# Patient Record
Sex: FEMALE | URBAN - METROPOLITAN AREA
[De-identification: names, ages, dates, MRNs, and addresses within clinical notes are randomized per-mention and may not be internally consistent; named-entity substitution may affect disease eponyms.]

---

## 2018-10-19 ENCOUNTER — APPOINTMENT (RX ONLY)
Dept: URBAN - METROPOLITAN AREA CLINIC 26 | Facility: CLINIC | Age: 48
Setting detail: DERMATOLOGY
End: 2018-10-19

## 2018-10-19 DIAGNOSIS — D18.0 HEMANGIOMA: ICD-10-CM

## 2018-10-19 PROBLEM — D18.01 HEMANGIOMA OF SKIN AND SUBCUTANEOUS TISSUE: Status: ACTIVE | Noted: 2018-10-19

## 2018-10-19 PROBLEM — L20.84 INTRINSIC (ALLERGIC) ECZEMA: Status: ACTIVE | Noted: 2018-10-19

## 2018-10-19 PROCEDURE — ? BENIGN DESTRUCTION COSMETIC MULTI

## 2018-10-19 ASSESSMENT — LOCATION SIMPLE DESCRIPTION DERM
LOCATION SIMPLE: RIGHT UPPER ARM
LOCATION SIMPLE: LEFT UPPER ARM
LOCATION SIMPLE: ABDOMEN
LOCATION SIMPLE: LEFT LOWER BACK
LOCATION SIMPLE: CHEST

## 2018-10-19 ASSESSMENT — LOCATION DETAILED DESCRIPTION DERM
LOCATION DETAILED: RIGHT MEDIAL SUPERIOR CHEST
LOCATION DETAILED: LEFT SUPERIOR LATERAL MIDBACK
LOCATION DETAILED: RIGHT ANTERIOR PROXIMAL UPPER ARM
LOCATION DETAILED: LEFT ANTERIOR PROXIMAL UPPER ARM
LOCATION DETAILED: EPIGASTRIC SKIN

## 2018-10-19 ASSESSMENT — LOCATION ZONE DERM
LOCATION ZONE: ARM
LOCATION ZONE: TRUNK

## 2018-10-19 NOTE — HPI: SKIN LESION (HEMANGIOMA(S))
Is This A New Presentation, Or A Follow-Up?: Skin Lesions
Additional History: Patient states she had several treated at last visit with electrodesiccation. She would like to have more of these treated today if possible.

## 2019-10-02 ENCOUNTER — APPOINTMENT (RX ONLY)
Dept: URBAN - METROPOLITAN AREA CLINIC 26 | Facility: CLINIC | Age: 49
Setting detail: DERMATOLOGY
End: 2019-10-02

## 2019-10-02 DIAGNOSIS — L82.1 OTHER SEBORRHEIC KERATOSIS: ICD-10-CM

## 2019-10-02 DIAGNOSIS — D18.0 HEMANGIOMA: ICD-10-CM

## 2019-10-02 PROBLEM — D18.01 HEMANGIOMA OF SKIN AND SUBCUTANEOUS TISSUE: Status: ACTIVE | Noted: 2019-10-02

## 2019-10-02 PROCEDURE — ? BENIGN DESTRUCTION COSMETIC

## 2019-10-02 PROCEDURE — ? COUNSELING

## 2019-10-02 PROCEDURE — ? BENIGN DESTRUCTION COSMETIC MULTI

## 2019-10-02 ASSESSMENT — LOCATION DETAILED DESCRIPTION DERM
LOCATION DETAILED: RIGHT LATERAL SUPERIOR CHEST
LOCATION DETAILED: PERIUMBILICAL SKIN
LOCATION DETAILED: LOWER STERNUM
LOCATION DETAILED: RIGHT INFERIOR LATERAL NECK
LOCATION DETAILED: RIGHT VENTRAL PROXIMAL FOREARM
LOCATION DETAILED: RIGHT LATERAL ABDOMEN
LOCATION DETAILED: LEFT MEDIAL BREAST 10-11:00 REGION
LOCATION DETAILED: RIGHT ANTECUBITAL SKIN
LOCATION DETAILED: RIGHT MEDIAL SUPERIOR CHEST
LOCATION DETAILED: LEFT LATERAL ABDOMEN
LOCATION DETAILED: RIGHT ANTERIOR LATERAL PROXIMAL THIGH
LOCATION DETAILED: LEFT LATERAL BREAST 12-1:00 REGION

## 2019-10-02 ASSESSMENT — LOCATION SIMPLE DESCRIPTION DERM
LOCATION SIMPLE: LEFT BREAST
LOCATION SIMPLE: CHEST
LOCATION SIMPLE: RIGHT THIGH
LOCATION SIMPLE: RIGHT UPPER ARM
LOCATION SIMPLE: ABDOMEN
LOCATION SIMPLE: RIGHT FOREARM
LOCATION SIMPLE: RIGHT ANTERIOR NECK

## 2019-10-02 ASSESSMENT — LOCATION ZONE DERM
LOCATION ZONE: NECK
LOCATION ZONE: TRUNK
LOCATION ZONE: ARM
LOCATION ZONE: LEG

## 2019-10-02 NOTE — PROCEDURE: BENIGN DESTRUCTION COSMETIC MULTI
Total Number Of Lesions Treated: 12
Price (Use Numbers Only, No Special Characters Or $): 191
Post-Care Instructions: I reviewed with the patient in detail post-care instructions. Patient is to wear sunprotection, and avoid picking at any of the treated lesions. Pt may apply Vaseline to crusted or scabbing areas.
Detail Level: Simple
Consent: The patient's consent was obtained including but not limited to risks of crusting, scabbing, blistering, scarring, darker or lighter pigmentary change, recurrence, incomplete removal and infection.
Anesthesia Volume In Cc: 0.5

## 2019-10-02 NOTE — PROCEDURE: BENIGN DESTRUCTION COSMETIC
Detail Level: Detailed
Consent: The patient's consent was obtained including but not limited to risks of crusting, scabbing, blistering, scarring, darker or lighter pigmentary change, recurrence, incomplete removal and infection.
Price (Use Numbers Only, No Special Characters Or $): -
Anesthesia Type: 1% lidocaine without epinephrine
Post-Care Instructions: I reviewed with the patient in detail post-care instructions. Patient is to wear sunprotection, and avoid picking at any of the treated lesions. Pt may apply Vaseline to crusted or scabbing areas.
Anesthesia Volume In Cc: 0.5

## 2020-05-30 NOTE — HPI: SKIN LESION
Has Your Skin Lesion Been Treated?: not been treated
Is This A New Presentation, Or A Follow-Up?: Skin Lesions
clear

## 2021-06-11 ENCOUNTER — APPOINTMENT (OUTPATIENT)
Dept: RADIOLOGY | Age: 51
End: 2021-06-11
Attending: FAMILY MEDICINE
Payer: COMMERCIAL

## 2021-06-11 ENCOUNTER — OFFICE VISIT (OUTPATIENT)
Dept: PRIMARY CARE | Facility: CLINIC | Age: 51
End: 2021-06-11
Payer: COMMERCIAL

## 2021-06-11 ENCOUNTER — HOSPITAL ENCOUNTER (OUTPATIENT)
Facility: CLINIC | Age: 51
Discharge: HOME | End: 2021-06-11
Attending: FAMILY MEDICINE
Payer: COMMERCIAL

## 2021-06-11 ENCOUNTER — TELEPHONE (OUTPATIENT)
Dept: PRIMARY CARE | Facility: CLINIC | Age: 51
End: 2021-06-11

## 2021-06-11 VITALS
WEIGHT: 147 LBS | TEMPERATURE: 98 F | HEIGHT: 62 IN | DIASTOLIC BLOOD PRESSURE: 74 MMHG | OXYGEN SATURATION: 99 % | RESPIRATION RATE: 16 BRPM | BODY MASS INDEX: 27.05 KG/M2 | HEART RATE: 77 BPM | SYSTOLIC BLOOD PRESSURE: 122 MMHG

## 2021-06-11 VITALS
WEIGHT: 147.8 LBS | TEMPERATURE: 98 F | OXYGEN SATURATION: 98 % | BODY MASS INDEX: 27.2 KG/M2 | SYSTOLIC BLOOD PRESSURE: 120 MMHG | HEART RATE: 75 BPM | HEIGHT: 62 IN | DIASTOLIC BLOOD PRESSURE: 76 MMHG

## 2021-06-11 DIAGNOSIS — S93.402A SPRAIN OF LEFT ANKLE, INITIAL ENCOUNTER: Primary | ICD-10-CM

## 2021-06-11 DIAGNOSIS — M25.572 LEFT ANKLE PAIN, UNSPECIFIED CHRONICITY: Primary | ICD-10-CM

## 2021-06-11 PROCEDURE — 99202 OFFICE O/P NEW SF 15 MIN: CPT | Performed by: FAMILY MEDICINE

## 2021-06-11 PROCEDURE — 73610 X-RAY EXAM OF ANKLE: CPT | Mod: LT | Performed by: FAMILY MEDICINE

## 2021-06-11 PROCEDURE — S9083 URGENT CARE CENTER GLOBAL: HCPCS | Performed by: FAMILY MEDICINE

## 2021-06-11 RX ORDER — MELATONIN 10 MG/ML
DROPS ORAL
COMMUNITY
End: 2023-12-13 | Stop reason: SDUPTHER

## 2021-06-11 ASSESSMENT — ENCOUNTER SYMPTOMS
CHILLS: 0
BACK PAIN: 0
STIFFNESS: 1
VOMITING: 0
JOINT SWELLING: 1
NAUSEA: 0
FATIGUE: 0
MUSCLE WEAKNESS: 0

## 2021-06-11 NOTE — DISCHARGE INSTRUCTIONS
Ice painful areas, 20mins every hour  Elevate injured body part(s) above the level of the heart.     Recommending ice bucket; place affected foot and ankle in a bucket of ice water, 20min in, 40mins out for additional relief.     Remember,  Ice is Nice, that's why they rhyme, and you can use it ALL the time : )       For pain:    You may alternate Ibuprofen with Tylenol every 3-4 hours. (Ex: Ibuprofen at 8am, Tylenol at 11am, Ibuprofen at 2pm, etc) as needed.   Ibuprofen 600mg every 6hr, Always with food     Tylenol 500mg-650mg doses every 6hrs      If symptoms persist or worsen, consider following up with Primary Care Provider and/or an Orthopedic specialist.      Please let us know about your experience today!   Your input is truly appreciated and helps Dr. Barr and your team at [facility] to continue to provide a superior level of service!   Get Well Soon!

## 2021-06-11 NOTE — ED PROVIDER NOTES
History  No chief complaint on file.    Inversion L ankle injury sustained 5 days ago. Minimal improvement.   Denies foot pain. Able to ambulate with some discomfort.       History provided by:  Patient   used: No    Ankle Pain  Location:  Ankle  Time since incident:  5 days  Injury: yes    Ankle location:  L ankle  Pain details:     Quality:  Aching    Radiates to:  Does not radiate    Severity:  Moderate    Onset quality:  Sudden    Duration:  5 days    Timing:  Constant    Progression:  Unchanged  Chronicity:  New  Dislocation: no    Prior injury to area:  No  Relieved by:  Rest  Worsened by:  Bearing weight and activity  Associated symptoms: stiffness and swelling    Associated symptoms: no back pain, no decreased ROM, no fatigue, no muscle weakness and no tingling        No past medical history on file.    No past surgical history on file.    Family History   Problem Relation Age of Onset   • Breast cancer Biological Mother    • Diabetes Biological Mother        Social History     Tobacco Use   • Smoking status: Never Smoker   • Smokeless tobacco: Never Used   Substance Use Topics   • Alcohol use: Never   • Drug use: Never       Review of Systems   Constitutional: Negative for chills and fatigue.   Gastrointestinal: Negative for nausea and vomiting.   Musculoskeletal: Positive for gait problem, joint swelling and stiffness. Negative for back pain.       Physical Exam  ED Triage Vitals   Temp Pulse Resp BP SpO2   -- -- -- -- --      Temp src Heart Rate Source Patient Position BP Location FiO2 (%) (Set)   -- -- -- -- --       Physical Exam  Constitutional:       General: She is not in acute distress.     Appearance: Normal appearance.   HENT:      Head: Normocephalic.   Eyes:      Extraocular Movements: Extraocular movements intact.   Musculoskeletal:         General: Swelling and tenderness present.      Cervical back: Normal range of motion.      Left ankle: Swelling present. No deformity,  ecchymosis or lacerations. Tenderness present over the lateral malleolus. No medial malleolus tenderness. Decreased range of motion. Anterior drawer test negative.   Neurological:      Mental Status: She is alert.           Procedures  Procedures    UC Course  Clinical Impressions as of Jun 11 1351   Sprain of left ankle, initial encounter       MDM  Number of Diagnoses or Management Options  Sprain of left ankle, initial encounter  Diagnosis management comments: Inversion L ankle injury sustained 5 days ago. Minimal improvement.   Denies foot pain. Able to ambulate with some discomfort.   Exam shows L lateral>medial ankle swelling with pain to palpation.   Xray shows No fracture.  Lateral soft tissue swelling indicates ligamentous injury.  Soft tissue swelling in the anterior tibiotalar junction.  Ankle brace provided.   Recommending RICE & NSAIDs  Recommending Orthopedic specialist if symptoms persist/worsen.          Amount and/or Complexity of Data Reviewed  Tests in the radiology section of CPT®: ordered and reviewed    Risk of Complications, Morbidity, and/or Mortality  Presenting problems: low  Diagnostic procedures: low  Management options: low    Critical Care  Total time providing critical care: < 30 minutes    Patient Progress  Patient progress: stable                 Eric Barr DO  06/11/21 1813

## 2021-06-11 NOTE — TELEPHONE ENCOUNTER
Pt  called  He is a pt of Dr Weldon ... And stated  that his wife  Fell 5 days ago and  Her ankle is swollen and has trouble walking on it.  She used to see DR Lane  So I scheduled pt because they have [employer] ppo  Choice .....  Pt went back in room and  Nely was told that she already had a pcp in [facility].  and just wants an xray.     per Dr Barrios she did not feel comforttable seeing her .    they went to urgent care    Upon leaving    said to me see you later and thank you

## 2023-07-06 ENCOUNTER — TELEPHONE (OUTPATIENT)
Dept: SURGERY | Facility: CLINIC | Age: 53
End: 2023-07-06
Payer: COMMERCIAL

## 2023-07-06 NOTE — TELEPHONE ENCOUNTER
Contacted pt to verify she has a disc/reports of breast imaging for her new patient appt with Dr. Li. Pt expressed she has everything from [facility]. Pt expressed that she contacted the PH office today and they confirmed she can just bring it to her appt rather than drop it off beforehand. I asked that she arrive 45 minutes to 1 hour early to allow time for upload. Pt confirmed.

## 2023-07-13 ENCOUNTER — OFFICE VISIT (OUTPATIENT)
Dept: SURGERY | Facility: CLINIC | Age: 53
End: 2023-07-13
Payer: COMMERCIAL

## 2023-07-13 VITALS
WEIGHT: 153.4 LBS | HEART RATE: 75 BPM | OXYGEN SATURATION: 99 % | HEIGHT: 62 IN | TEMPERATURE: 98.1 F | SYSTOLIC BLOOD PRESSURE: 110 MMHG | DIASTOLIC BLOOD PRESSURE: 80 MMHG | BODY MASS INDEX: 28.23 KG/M2

## 2023-07-13 RX ORDER — ACETAMINOPHEN 500 MG
2000 TABLET ORAL DAILY
COMMUNITY

## 2023-07-28 ENCOUNTER — TELEPHONE (OUTPATIENT)
Dept: SURGERY | Facility: CLINIC | Age: 53
End: 2023-07-28
Payer: COMMERCIAL

## 2023-07-28 NOTE — TELEPHONE ENCOUNTER
[facility] Appointment Request   Provider: Jen  Appointment Type: New pt 2nd opinion  Breast Lump without pain  Reason for Visit: 2nd opinion LT Breast Lump without pain-New imaging studies will be on on 8/3 at [facility]   Available Day and Time:  or C  Best Contact Number: [phone]    Pt states that she was she was seen on 7/13 briefly with Dr. Li and was needed to have imaging done. She will have diagnostic Mammogram and U/S done on 8/3 at [facility] and will bring report and disc. She was to follow up after those studies.     The practice will reach out to schedule your appointment within the next 2 business days.

## 2023-08-01 NOTE — TELEPHONE ENCOUNTER
Patient was calling to check the status on her appointment request.     Please advise Pt's [phone]

## 2023-08-21 ENCOUNTER — APPOINTMENT (RX ONLY)
Dept: URBAN - METROPOLITAN AREA CLINIC 26 | Facility: CLINIC | Age: 53
Setting detail: DERMATOLOGY
End: 2023-08-21

## 2023-08-21 DIAGNOSIS — L64.8 OTHER ANDROGENIC ALOPECIA: ICD-10-CM

## 2023-08-21 DIAGNOSIS — H02.6 XANTHELASMA OF EYELID: ICD-10-CM

## 2023-08-21 DIAGNOSIS — D18.0 HEMANGIOMA: ICD-10-CM

## 2023-08-21 PROBLEM — H02.60 XANTHELASMA OF UNSPECIFIED EYE, UNSPECIFIED EYELID: Status: ACTIVE | Noted: 2023-08-21

## 2023-08-21 PROBLEM — D18.01 HEMANGIOMA OF SKIN AND SUBCUTANEOUS TISSUE: Status: ACTIVE | Noted: 2023-08-21

## 2023-08-21 PROBLEM — L65.9 NONSCARRING HAIR LOSS, UNSPECIFIED: Status: ACTIVE | Noted: 2023-08-21

## 2023-08-21 PROCEDURE — ? ADDITIONAL NOTES

## 2023-08-21 PROCEDURE — ? COUNSELING

## 2023-08-21 PROCEDURE — ? DEFER

## 2023-08-21 PROCEDURE — 99203 OFFICE O/P NEW LOW 30 MIN: CPT

## 2023-08-21 PROCEDURE — ? REFERRAL

## 2023-08-21 ASSESSMENT — LOCATION SIMPLE DESCRIPTION DERM
LOCATION SIMPLE: LEFT FOREARM
LOCATION SIMPLE: RIGHT CHEEK
LOCATION SIMPLE: LEFT CHEEK
LOCATION SIMPLE: RIGHT FOREARM

## 2023-08-21 ASSESSMENT — LOCATION ZONE DERM
LOCATION ZONE: ARM
LOCATION ZONE: FACE

## 2023-08-21 ASSESSMENT — LOCATION DETAILED DESCRIPTION DERM
LOCATION DETAILED: LEFT PROXIMAL DORSAL FOREARM
LOCATION DETAILED: LEFT SUPERIOR MEDIAL MALAR CHEEK
LOCATION DETAILED: RIGHT SUPERIOR MEDIAL MALAR CHEEK
LOCATION DETAILED: RIGHT PROXIMAL DORSAL FOREARM

## 2023-08-21 NOTE — PROCEDURE: DEFER
Procedure To Be Performed At Next Visit: Biopsy by punch method
X Size Of Lesion In Cm (Optional): 0
Detail Level: Zone
Introduction Text (Please End With A Colon): The following procedure was deferred:
Procedure To Be Performed At Next Visit: Cautery
Instructions (Optional): Cosmetic shave removal with cautery\\nOffered PDL; pt has had above treatment before and was pleased

## 2023-08-21 NOTE — PROCEDURE: ADDITIONAL NOTES
Additional Notes: Discussed biopsy for definitive diagnosis given inflammation on exam today. Pt declines at this time. Hesitant to pursue any treatments other than biotin supplementation. Inquiring about PRP.
Render Risk Assessment In Note?: no
Detail Level: Zone

## 2023-08-24 ENCOUNTER — TELEPHONE (OUTPATIENT)
Dept: SURGERY | Facility: CLINIC | Age: 53
End: 2023-08-24
Payer: COMMERCIAL

## 2023-12-07 ENCOUNTER — TELEPHONE (OUTPATIENT)
Dept: SURGERY | Facility: CLINIC | Age: 53
End: 2023-12-07
Payer: COMMERCIAL

## 2023-12-13 ENCOUNTER — OFFICE VISIT (OUTPATIENT)
Dept: SURGERY | Facility: CLINIC | Age: 53
End: 2023-12-13
Payer: COMMERCIAL

## 2023-12-13 VITALS
TEMPERATURE: 98.3 F | BODY MASS INDEX: 27.05 KG/M2 | HEIGHT: 62 IN | HEART RATE: 75 BPM | RESPIRATION RATE: 16 BRPM | OXYGEN SATURATION: 99 % | WEIGHT: 147 LBS | DIASTOLIC BLOOD PRESSURE: 73 MMHG | SYSTOLIC BLOOD PRESSURE: 113 MMHG

## 2023-12-13 DIAGNOSIS — Z80.3 FAMILY HISTORY OF MALIGNANT NEOPLASM OF BREAST: ICD-10-CM

## 2023-12-13 DIAGNOSIS — R92.333 HETEROGENEOUSLY DENSE TISSUE OF BOTH BREASTS ON MAMMOGRAPHY: Primary | ICD-10-CM

## 2023-12-13 DIAGNOSIS — R92.8 ABNORMAL ULTRASOUND OF BREAST: ICD-10-CM

## 2023-12-13 PROCEDURE — 99203 OFFICE O/P NEW LOW 30 MIN: CPT | Performed by: SURGERY

## 2023-12-13 PROCEDURE — 3008F BODY MASS INDEX DOCD: CPT | Performed by: SURGERY

## 2023-12-13 RX ORDER — ROSUVASTATIN CALCIUM 5 MG/1
5 TABLET, COATED ORAL DAILY
COMMUNITY
Start: 2023-12-01

## 2023-12-13 ASSESSMENT — ENCOUNTER SYMPTOMS
PSYCHIATRIC NEGATIVE: 1
CARDIOVASCULAR NEGATIVE: 1
GASTROINTESTINAL NEGATIVE: 1
MUSCULOSKELETAL NEGATIVE: 1
ALLERGIC/IMMUNOLOGIC NEGATIVE: 1
EYES NEGATIVE: 1
ENDOCRINE NEGATIVE: 1
NEUROLOGICAL NEGATIVE: 1
HEMATOLOGIC/LYMPHATIC NEGATIVE: 1
CONSTITUTIONAL NEGATIVE: 1
RESPIRATORY NEGATIVE: 1

## 2023-12-13 ASSESSMENT — PAIN SCALES - GENERAL: PAINLEVEL: 0-NO PAIN

## 2023-12-13 NOTE — ASSESSMENT & PLAN NOTE
We reviewed the pros and cons of screening with breast MRI.  At the present time given the postmenopausal diagnosis she does not feel strongly about pursuing this.  She can always call me if she changes her mind.

## 2023-12-13 NOTE — ASSESSMENT & PLAN NOTE
She is unlikely to have a genetic mutation but this would significantly change her management.  She is not sure if she wants to pursue genetic testing but we will give her referral to the genetics program.

## 2023-12-13 NOTE — ASSESSMENT & PLAN NOTE
I do think this mass is probably stable and a fibroadenoma and certainly has very benign imaging characteristics.  We did discuss the option of simple 1 year follow-up and at the end of 2 years this would be declared stable.  She is comfortable with this.  We also discussed the possibility of further evaluation with breast MRI but she will hold off on this for now.

## 2023-12-13 NOTE — LETTER
2023     Tiny Key MD  [address] [facility] [address]    Patient: Syd Turner  YOB: 1970  Date of Visit: 2023      Dear Dr. Key:    Thank you for referring Syd Turner to me for evaluation. Below are my notes for this consultation.    If you have questions, please do not hesitate to call me. I look forward to following your patient along with you.         Sincerely,        Jenise Li MD        CC: No Recipients    Jenise Li MD  2023  3:36 PM  Sign when Signing Visit      Breast Surgical Consultation        Patient: Syd Turner                                : 1970    Date of consultation: 2023    Referring Provider: Tiny Key MD  PCP: Tiny Key MD  GYN: No care team member to display      Chief Complaint: Abnormal left mammogram     Syd Turner is a 53 y.o. old female presenting today for consultation at the recommendation of Dr. Key for further evaluation of an abnormal left mammogram.  Syd had been followed through the [facility] system with surveillance imaging of probable benign findings in the left breast.  She had a bilateral mammogram on 2022 along with a left breast ultrasound that showed stable masses in the left breast at the 9:00 axis as well as an 8 mm mass at 4:00, 1 cm from the nipple with benign features suggestive of a fibroadenoma a 6-month follow-up was recommended.  She had a bilateral diagnostic mammogram and left breast ultrasound in August 3, 2023 which showed stability of the previously identified masses in the left breast.  She then followed up with Dr. Bangura at [facility] for discussion regarding observation versus biopsy with core needle or surgical excision.  She opted to continue with observation.  She saw Dr. Joanne Diana through the [facility] system at the [facility] location on 2023 for a second opinion.  She was recommended a 6-month  follow-up left breast ultrasound which will be due in .  She was recommended to resume annual screening as there has been stability in the left breast masses, which will be due in . Syd reports a family history of breast cancer in her mother who was 64 at diagnosis with no genetic testing.  She admits she is very anxious regarding her own breast health given her mother's history.  One of three   remaining maternal aunts had uterine cancer in her 40s.  She offers no breast complaints.  She is nulliparous.  She has heterogeneously dense breast tissue.     Breast Imaging: Reviewed in the office.    Breast History: Excisional biopsy of the left breast approximately 30 years ago for patient reported benign disease    OB/GYN Status    Currently Pregnant:  N/A   Last Menstrual Period:  N/A   Menstrual Status:   Postmenopausal   LMP Comment:   N/A   Menarche Age:  9   Breastfeeding?  N/A   Lactation Comment:   N/A     OB History        0    Para   0    Term   0       0    AB   0    Living   0       SAB   0    IAB   0    Ectopic   0    Multiple   0    Live Births   0             Medications: has a current medication list which includes the following prescription(s): cholecalciferol (vitamin d3), multivitamin, and rosuvastatin.    Family History: family history includes Breast cancer in her biological mother; Diabetes in her biological mother; No Known Problems in her biological sister.  Past Medical History:  has no past medical history on file.  Past Surgical History:  has a past surgical history that includes Breast biopsy (Left).  Social History:   Social History     Tobacco Use   • Smoking status: Never   • Smokeless tobacco: Never   Vaping Use   • Vaping Use: Never used   Substance Use Topics   • Alcohol use: Never   • Drug use: Never     Sexual History: Sexual activity questions deferred to the physician.  Allergies: has No Known Allergies.     Review of Systems  "  Constitutional: Negative.    HENT: Negative.    Eyes: Negative.    Respiratory: Negative.    Cardiovascular: Negative.    Gastrointestinal: Negative.    Endocrine: Negative.    Genitourinary: Negative.    Musculoskeletal: Negative.    Skin: Negative.    Allergic/Immunologic: Negative.    Neurological: Negative.    Hematological: Negative.    Psychiatric/Behavioral: Negative.      Objective   Vitals:   Visit Vitals  /73   Pulse 75   Temp 36.8 °C (98.3 °F) (Skin)   Resp 16   Ht 1.575 m (5' 2\")   Wt 66.7 kg (147 lb)   SpO2 99%   BMI 26.89 kg/m²     Physical Exam  Constitutional:       Appearance: She is well-developed.   HENT:      Head: Normocephalic and atraumatic.   Eyes:      General: No scleral icterus.  Cardiovascular:      Rate and Rhythm: Normal rate and regular rhythm.      Heart sounds: Normal heart sounds.   Pulmonary:      Effort: Pulmonary effort is normal.      Breath sounds: Normal breath sounds.   Chest:       Abdominal:      Palpations: Abdomen is soft.   Musculoskeletal:         General: Normal range of motion.      Cervical back: Normal range of motion and neck supple.   Skin:     General: Skin is warm and dry.   Neurological:      Mental Status: She is alert and oriented to person, place, and time.   Psychiatric:         Mood and Affect: Mood normal.         Behavior: Behavior normal.         Thought Content: Thought content normal.         Judgment: Judgment normal.     Breast exam: The breasts are symmetrical.  There is no cervical, supraclavicular, or axillary adenopathy.  She has a well-healed incision in the lower outer left breast.  Bilaterally, there are no dominant masses, skin changes, nipple discharge or nipple retraction.    Assessment/Plan   Problem List Items Addressed This Visit        Other    Family history of malignant neoplasm of breast     She is unlikely to have a genetic mutation but this would significantly change her management.  She is not sure if she wants to pursue " genetic testing but we will give her referral to the genetics program.         Relevant Orders    BI DIAGNOSTIC MAMMOGRAM BILATERAL (TOMOSYNTHESIS)    ULTRASOUND BREAST LIMITED LEFT    Heterogeneously dense tissue of both breasts on mammography - Primary     We reviewed the pros and cons of screening with breast MRI.  At the present time given the postmenopausal diagnosis she does not feel strongly about pursuing this.  She can always call me if she changes her mind.         Abnormal ultrasound of breast     I do think this mass is probably stable and a fibroadenoma and certainly has very benign imaging characteristics.  We did discuss the option of simple 1 year follow-up and at the end of 2 years this would be declared stable.  She is comfortable with this.  We also discussed the possibility of further evaluation with breast MRI but she will hold off on this for now.         Relevant Orders    BI DIAGNOSTIC MAMMOGRAM BILATERAL (TOMOSYNTHESIS)    ULTRASOUND BREAST LIMITED LEFT   TEO Villalpando    I personally reviewed the films, reports, performed a physical examination and discussed the situation with Syd.  She is very comfortable with the above approach.  She is aware that she will be due for her mammography and left ultrasound this August.  She can always change her mind if she decides to pursue screening breast MRI.    Jenise Li MD

## 2023-12-13 NOTE — PROGRESS NOTES
Breast Surgical Consultation        Patient: Syd Turner                                : 1970    Date of consultation: 2023    Referring Provider: Tiny Key MD  PCP: Tiny Key MD  GYN: No care team member to display      Chief Complaint: Abnormal left mammogram     Syd Turner is a 53 y.o. old female presenting today for consultation at the recommendation of Dr. Key for further evaluation of an abnormal left mammogram.  Syd had been followed through the [facility] system with surveillance imaging of probable benign findings in the left breast.  She had a bilateral mammogram on 2022 along with a left breast ultrasound that showed stable masses in the left breast at the 9:00 axis as well as an 8 mm mass at 4:00, 1 cm from the nipple with benign features suggestive of a fibroadenoma a 6-month follow-up was recommended.  She had a bilateral diagnostic mammogram and left breast ultrasound in August 3, 2023 which showed stability of the previously identified masses in the left breast.  She then followed up with Dr. Bangura at [facility] for discussion regarding observation versus biopsy with core needle or surgical excision.  She opted to continue with observation.  She saw Dr. Joanne Diana through the [facility] at the [facility] location on 2023 for a second opinion.  She was recommended a 6-month follow-up left breast ultrasound which will be due in .  She was recommended to resume annual screening as there has been stability in the left breast masses, which will be due in . Syd reports a family history of breast cancer in her mother who was 64 at diagnosis with no genetic testing.  She admits she is very anxious regarding her own breast health given her mother's history.  One of three   remaining maternal aunts had uterine cancer in her 40s.  She offers no breast complaints.  She is nulliparous.   "She has heterogeneously dense breast tissue.     Breast Imaging: Reviewed in the office.    Breast History: Excisional biopsy of the left breast approximately 30 years ago for patient reported benign disease    OB/GYN Status    Currently Pregnant:  N/A   Last Menstrual Period:  N/A   Menstrual Status:   Postmenopausal   LMP Comment:   N/A   Menarche Age:  9   Breastfeeding?  N/A   Lactation Comment:   N/A     OB History        0    Para   0    Term   0       0    AB   0    Living   0       SAB   0    IAB   0    Ectopic   0    Multiple   0    Live Births   0             Medications: has a current medication list which includes the following prescription(s): cholecalciferol (vitamin d3), multivitamin, and rosuvastatin.    Family History: family history includes Breast cancer in her biological mother; Diabetes in her biological mother; No Known Problems in her biological sister.  Past Medical History:  has no past medical history on file.  Past Surgical History:  has a past surgical history that includes Breast biopsy (Left).  Social History:   Social History     Tobacco Use   • Smoking status: Never   • Smokeless tobacco: Never   Vaping Use   • Vaping Use: Never used   Substance Use Topics   • Alcohol use: Never   • Drug use: Never     Sexual History: Sexual activity questions deferred to the physician.  Allergies: has No Known Allergies.     Review of Systems   Constitutional: Negative.    HENT: Negative.    Eyes: Negative.    Respiratory: Negative.    Cardiovascular: Negative.    Gastrointestinal: Negative.    Endocrine: Negative.    Genitourinary: Negative.    Musculoskeletal: Negative.    Skin: Negative.    Allergic/Immunologic: Negative.    Neurological: Negative.    Hematological: Negative.    Psychiatric/Behavioral: Negative.      Objective   Vitals:   Visit Vitals  /73   Pulse 75   Temp 36.8 °C (98.3 °F) (Skin)   Resp 16   Ht 1.575 m (5' 2\")   Wt 66.7 kg (147 lb)   SpO2 99%   BMI 26.89 " kg/m²     Physical Exam  Constitutional:       Appearance: She is well-developed.   HENT:      Head: Normocephalic and atraumatic.   Eyes:      General: No scleral icterus.  Cardiovascular:      Rate and Rhythm: Normal rate and regular rhythm.      Heart sounds: Normal heart sounds.   Pulmonary:      Effort: Pulmonary effort is normal.      Breath sounds: Normal breath sounds.   Chest:       Abdominal:      Palpations: Abdomen is soft.   Musculoskeletal:         General: Normal range of motion.      Cervical back: Normal range of motion and neck supple.   Skin:     General: Skin is warm and dry.   Neurological:      Mental Status: She is alert and oriented to person, place, and time.   Psychiatric:         Mood and Affect: Mood normal.         Behavior: Behavior normal.         Thought Content: Thought content normal.         Judgment: Judgment normal.     Breast exam: The breasts are symmetrical.  There is no cervical, supraclavicular, or axillary adenopathy.  She has a well-healed incision in the lower outer left breast.  Bilaterally, there are no dominant masses, skin changes, nipple discharge or nipple retraction.     Assessment/Plan   Problem List Items Addressed This Visit        Other    Family history of malignant neoplasm of breast     She is unlikely to have a genetic mutation but this would significantly change her management.  She is not sure if she wants to pursue genetic testing but we will give her referral to the genetics program.         Relevant Orders    BI DIAGNOSTIC MAMMOGRAM BILATERAL (TOMOSYNTHESIS)    ULTRASOUND BREAST LIMITED LEFT    Heterogeneously dense tissue of both breasts on mammography - Primary     We reviewed the pros and cons of screening with breast MRI.  At the present time given the postmenopausal diagnosis she does not feel strongly about pursuing this.  She can always call me if she changes her mind.         Abnormal ultrasound of breast     I do think this mass is probably  stable and a fibroadenoma and certainly has very benign imaging characteristics.  We did discuss the option of simple 1 year follow-up and at the end of 2 years this would be declared stable.  She is comfortable with this.  We also discussed the possibility of further evaluation with breast MRI but she will hold off on this for now.         Relevant Orders    BI DIAGNOSTIC MAMMOGRAM BILATERAL (TOMOSYNTHESIS)    ULTRASOUND BREAST LIMITED LEFT   TEO Villalpando    I personally reviewed the films, reports, performed a physical examination and discussed the situation with Syd.  She is very comfortable with the above approach.  She is aware that she will be due for her mammography and left ultrasound this August.  She can always change her mind if she decides to pursue screening breast MRI.    Jenise Li MD

## 2024-04-22 NOTE — PROCEDURE: BENIGN DESTRUCTION COSMETIC MULTI
Total Number Of Lesions Treated: 14
[N] : Patient does not use contraception
[Y] : Patient is sexually active
Price (Use Numbers Only, No Special Characters Or $): 138
[Frequency: Q ___ days] : menstrual periods occur approximately every [unfilled] days
[Menarche Age: ____] : age at menarche was [unfilled]
Post-Care Instructions: I reviewed with the patient in detail post-care instructions. Patient is to wear sunprotection, and avoid picking at any of the treated lesions. Pt may apply Vaseline to crusted or scabbing areas.
[Regular Cycle Intervals] : periods have been regular
Detail Level: Simple
[PGHxTotal] : 3
Consent: The patient's consent was obtained including but not limited to risks of crusting, scabbing, blistering, scarring, darker or lighter pigmentary change, recurrence, incomplete removal and infection.
[facility] : [facility]
[PGHxPremature] : 0
[PGHxAbortions] : 0
[[facility] : [facility]
[PGHxABInduced] : 0
[PGHxABSpont] : 0
[PGHxEctopic] : 0
[PGHxMultBirths] : 0

## 2024-12-11 ENCOUNTER — TRANSCRIBE ORDERS (OUTPATIENT)
Dept: SCHEDULING | Age: 54
End: 2024-12-11

## 2024-12-11 DIAGNOSIS — M25.532 PAIN IN LEFT WRIST: Primary | ICD-10-CM

## 2024-12-11 DIAGNOSIS — M65.842 OTHER SYNOVITIS AND TENOSYNOVITIS, LEFT HAND: ICD-10-CM

## 2024-12-28 ENCOUNTER — HOSPITAL ENCOUNTER (OUTPATIENT)
Dept: RADIOLOGY | Facility: HOSPITAL | Age: 54
Discharge: HOME | End: 2024-12-28
Attending: ORTHOPAEDIC SURGERY
Payer: COMMERCIAL

## 2024-12-28 DIAGNOSIS — M65.842 OTHER SYNOVITIS AND TENOSYNOVITIS, LEFT HAND: ICD-10-CM

## 2024-12-28 DIAGNOSIS — M25.532 PAIN IN LEFT WRIST: ICD-10-CM

## 2024-12-28 PROCEDURE — 73221 MRI JOINT UPR EXTREM W/O DYE: CPT | Mod: LT
